# Patient Record
Sex: MALE | Race: WHITE
[De-identification: names, ages, dates, MRNs, and addresses within clinical notes are randomized per-mention and may not be internally consistent; named-entity substitution may affect disease eponyms.]

---

## 2018-10-11 ENCOUNTER — HOSPITAL ENCOUNTER (EMERGENCY)
Dept: HOSPITAL 44 - ED | Age: 68
Discharge: HOME | End: 2018-10-11
Payer: COMMERCIAL

## 2018-10-11 VITALS — DIASTOLIC BLOOD PRESSURE: 90 MMHG | SYSTOLIC BLOOD PRESSURE: 168 MMHG
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
## 2018-10-11 DIAGNOSIS — S01.81XA: ICD-10-CM

## 2018-10-11 DIAGNOSIS — S09.90XA: Primary | ICD-10-CM
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
## 2018-10-11 LAB
EGFR (NON-AFRICAN): > 60
MCH RBC QN AUTO: 28.7 PG (ref 28–34)
MCV RBC AUTO: 87 FL (ref 80–100)

## 2018-10-11 PROCEDURE — 12013 RPR F/E/E/N/L/M 2.6-5.0 CM: CPT

## 2018-10-11 PROCEDURE — 85025 COMPLETE CBC W/AUTO DIFF WBC: CPT

## 2018-10-11 PROCEDURE — 80053 COMPREHEN METABOLIC PANEL: CPT

## 2018-10-11 PROCEDURE — 70486 CT MAXILLOFACIAL W/O DYE: CPT

## 2018-10-11 PROCEDURE — 70450 CT HEAD/BRAIN W/O DYE: CPT

## 2018-10-11 PROCEDURE — 85610 PROTHROMBIN TIME: CPT

## 2018-10-11 NOTE — DIAGNOSTIC IMAGING REPORT
YEIMI GUAMAN 

[facility]

[address] [facility] [address] [facility] [address]

[place], [place]. [address]

 

 

 

 

Report Submission Date: Oct 11, 2018 7:26:36 PM CDT

Patient       Study

Name:   JOSE SANCHEZ       Date:   Oct 11, 2018 6:52:14 PM CDT

MRN:   [id number]       Modality Type:   CT

Gender:   M       Description:   CT MAXILLOFACIAL W/O D

:   48       Institution:   [facility]

Physician:   YEIMI GUAMAN

     Accession:    [id number]

 

 

CT maxillofacial without contrast 



History:  Right orbit laceration, fall 



Findings:  Transverse mandible and facial bones sections are obtained without 
contrast and are limited by motion through the orbital roofs.  A right frontal 
scalp laceration is present.  The superior orbital rim and orbital roof appear 
intact on the head CT but are distorted on this study.  The remaining facial 
bones and mandible are intact.  Small maxillary sinus mucous retention cysts are
present.  A right malar hematoma is observed. 



Impression: 



1. Nondiagnostic evaluation of the superior orbits due to motion but this region
appears intact on the head C T. 



2. Right malar hematoma and right frontal scalp laceration. 



3. Maxillary sinus retention cysts.

 

Electronically signed on Oct 11, 2018 7:26:36 PM CDT by:

Marques ROY

## 2018-10-11 NOTE — ED PHYSICIAN DOCUMENTATION
General Adult





- HISTORIAN


Historian: patient





- HPI


Stated Complaint: Facial Lac


Chief Complaint: Fall


Onset: minutes (30)


Timing: still present


Severity: moderate


Further Comments: yes (per staff with pt. He fell in the doorway at the living 

center he hit his head on the door way at the corner of his head/eye . No LOC. 

No change in mental status. No other complaints)


Last known Well Code/Unknown Code: Unknown





- ROS


CONST: no problems


GI/: none


MS/SKIN/LYMPH: none


NEURO/PSYCH: denies: headache





- PAST HX


Past History: other (hyperlpidemia, CAD, GERD )


Immunizations: UTD


Allergies/Adverse Reactions: 


                                    Allergies











Allergy/AdvReac Type Severity Reaction Status Date / Time


 


iodine Allergy   Verified 10/11/18 17:43














Home Medications: 


                                Ambulatory Orders











 Medication  Instructions  Recorded


 


Omeprazole Magnesium [Prilosec] 2 tab PO DAILY 10/11/18


 


RX: Aspirin [Dave] 1 tab PO DAILY 10/11/18


 


RX: Lovastatin [Altoprev] 1 tab PO HS 10/11/18


 


RX: Memantine HCl [Namenda] 1 tab PO BID 10/11/18


 


RX: Quetiapine Fumarate [Seroquel] 1 tab PO TID 10/11/18














- SOCIAL HX


Smoking History: non-smoker


Alcohol Use: none


Drug Use: none





- FAMILY HX


Family History: No





- VITAL SIGNS


Vital Signs: 





                                   Vital Signs











Temp Pulse Resp BP Pulse Ox


 


 98.2 F   74   17   160/78   94 


 


 10/11/18 17:22  10/11/18 17:22  10/11/18 17:22  10/11/18 17:22  10/11/18 17:22














- REVIEWED ASSESSMENTS


Nursing Assessment  Reviewed: Yes


Vitals Reviewed: Yes





Procedures


Wound Location: face


Wound's Depth, Shape: superficial


Wound Explored: clean


Anesthesia: 2% Lidocaine


Wound Repaired With: sutures


Suture Size/Type: 5:0


Number of Sutures: 4


Layer Closure?: No (laceration on mid forehead 3 cm superficial (dermabond due 

to dementia) )





Progress





- Progress


Progress: 





1930: during sutures pt was combative - with assist sutures in horizontal lac 

repair - dermabond on lateral due to mental status DG 


2000: discuss on results and wound care. Niece at bedside is agreeable DG 





ED Results Lab/Radiology





- Radiology


Radiology Impressions: 





CT brain without IV contrast 


Clinical history: History of fall 





Mastoid air cells and visible sinuses are clear. No visible skull fractures. No 

visible intracranial bleed, acute infarct, midline shift or hydrocephalus. 


No visible mass lesions. 


1.5 cm of subcutaneous soft tissue mass anterior to the anterior wall of the 

right maxillary sinus, please correlate clinically . 





Impression: 


Normal CT brain without IV contrast 


1.5 cm subcutaneous soft tissue mass in the right facial region 


Electronically signed on Oct 11, 2018 6:32:55 PM CDT by: 


Herberth Linares





- Orders


Orders: 





                                    ED Orders











 Category Date Time Status


 


 CT BRAIN W/O CONTRAST Stat Exams  10/11/18 Ordered


 


 CT MAXILLOFACIAL W/O DYE Stat Exams  10/11/18 Ordered


 


 CBC/PLATELET/DIFF Stat Lab  10/11/18 17:24 Ordered


 


 CMP Stat Lab  10/11/18 17:24 Ordered


 


 PT-INR Stat Lab  10/11/18 17:25 Ordered














General Adult Physical Exam





- PHYSICAL EXAM


GENERAL APPEARANCE: no distress


EENT: eye inspection normal, ENT inspection normal, no signs of dehydration


NECK: normal inspection


RESPIRATORY: no resp distress, chest non-tender, breath sounds normal


CVS: reg rate & rhythm, heart sounds normal, equal pulses


ABDOMEN: soft


BACK: normal inspection


SKIN: warm/dry, other (laceration on right side of forehead and down forehad mid

 approx 3 cm lateral laceration and 3 cm on right side 2 cm lateral  )


EXTREMITIES: non-tender, normal range of motion, no evidence of injury


NEURO: motor nml, sensation nml, mood/affect nml (per staff he is normal on LOC 

)





Discharge


Clincal Impression: 


Fall


Qualifiers:


 Encounter type: initial encounter Qualified Code(s): W19.XXXA - Unspecified 

fall, initial encounter





Additional Instructions: 


1. Keep area clean and dry


2. Ice to forehead


3. Follow up with PCP in 2-4 days


4. Return to ER for any concerns 


5. Tylenol or Ibuprofen as needed for pain 


Condition: Stable


Disposition: 01 HOME, SELF-CARE


Decision to Admit: NO


Date of Decison to Admit: 10/11/18


Decision Time: 20:02

## 2018-10-11 NOTE — DIAGNOSTIC IMAGING REPORT
YEIMI GUAMAN 

[facility]

[address] [facility] [address]

[place], [place]. [address]

 

 

 

 

Report Submission Date: Oct 11, 2018 6:32:55 PM CDT

Patient       Study

Name:   JOSE SANCHEZ       Date:   Oct 11, 2018 5:34:42 PM CDT

MRN:   [id number]       Modality Type:   CT\SR

Gender:   M       Description:   CT BRAIN W/O CONTRAST

:   48       Institution:   [facility]

Physician:   YEIMI GUAMAN

     Accession:    [id number]

 

 

CT brain without IV contrast 

Clinical history: History of fall 



Mastoid air cells and visible sinuses are clear. No visible skull fractures. No 
visible intracranial bleed, acute infarct, midline shift or hydrocephalus. 

No visible mass lesions. 

1.5 cm of subcutaneous soft tissue mass anterior to the anterior wall of the 
right maxillary sinus, please correlate clinically . 



Impression: 

Normal CT brain without IV contrast 

1.5 cm subcutaneous soft tissue mass in the right facial region

 

Electronically signed on Oct 11, 2018 6:32:55 PM CDT by:

Herberth ROY

## 2018-12-30 ENCOUNTER — HOSPITAL ENCOUNTER (EMERGENCY)
Dept: HOSPITAL 44 - ED | Age: 68
Discharge: NURSING, REHAB OR LONG-TERM CARE | End: 2018-12-30
Payer: COMMERCIAL

## 2018-12-30 VITALS — DIASTOLIC BLOOD PRESSURE: 64 MMHG | SYSTOLIC BLOOD PRESSURE: 122 MMHG
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
## 2018-12-30 DIAGNOSIS — S20.312A: Primary | ICD-10-CM
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
## 2018-12-30 PROCEDURE — 99284 EMERGENCY DEPT VISIT MOD MDM: CPT

## 2018-12-30 PROCEDURE — 99282 EMERGENCY DEPT VISIT SF MDM: CPT

## 2018-12-30 PROCEDURE — 72170 X-RAY EXAM OF PELVIS: CPT

## 2018-12-30 PROCEDURE — 71101 X-RAY EXAM UNILAT RIBS/CHEST: CPT

## 2018-12-30 NOTE — DIAGNOSTIC IMAGING REPORT
VALERI JOHNSTON [facility]

                           [address]

                           [place], [place]. [address]





<p>Your browser does not support iframes.</p> 

VALERI JOHNSTON  ANABELL

                         [facility]

                           [address]

                           [place], [place]. [address]









Report Submission Date: Dec 30, 2018 1:21:21 PM CST







Patient  Study  

 

Name: JOSE SANCHEZ  Date: Dec 30, 2018 12:37:10 PM CST

 

MRN: [id number]  Modality Type: DX

 

Gender: M  Description: CHEST

 

: 48  Institution: [facility]

 

Physician: PRESTON VALERI [facility]  Accession: [id number]







HISTORY: 68-year-old male fell, left chest wall pain. 



COMPARISON: None available. 



TECHNIQUE: Frontal view of the chest and multiple views of the left ribs were 
performed. 



FINDINGS: No pneumothorax, pulmonary edema, or consolidative infiltrates. The 
aortic arch is calcific. The heart is not enlarged. No evidence of rib or 
clavicular fracture. The bones are diffusely osteopenic. The right humeral head
is high-riding, consistent with significant rotator cuff tendinopathy. 



IMPRESSION: 



1. No evidence of left rib fracture. 

2. No acute intrathoracic process identified.



Electronically signed on Dec 30, 2018 1:21:21 PM CST by:

John ROY

## 2018-12-30 NOTE — DIAGNOSTIC IMAGING REPORT
Report Submission Date: Dec 30, 2018 1:18:39 PM CST







Patient  Study  

 

Name: JOSE SANCHEZ  Date: Dec 30, 2018 12:33:56 PM CST

 

MRN: [id number]  Modality Type: DX

 

Gender: M  Description: PELVIS

 

: 48  Institution: [facility]

 

Physician: VALERI JOHNSTON  Accession: [id number]







HISTORY: 68-year-old male fell, tail bone pain. 



COMPARISON: None available. 



TECHNIQUE: AP view of the pelvis was performed. 



IMPRESSION: 



1. No acute fracture of the hips or pelvis. The coccyx is not well visualized 
here. 

2. Postoperative changes of posterior spinal fusion L4-L5. 

3. Atherosclerotic calcifications of the bilateral iliac and proximal femoral 
arteries.



Electronically signed on Dec 30, 2018 1:18:39 PM CST by:

John ROY

## 2018-12-30 NOTE — ED PHYSICIAN DOCUMENTATION
General Adult





- HISTORIAN


Historian: paramedics





- [facility]


Stated Complaint: Fall


Chief Complaint: General Adult


Onset: hours


Timing: still present


Severity: moderate


Further Comments: yes (Pt is a 67 yo nursing home pt found on the floor in a 

fetal position.  It is unclear if pt fell or got onto the floor himself as he 

has been known to do.  Pt appeared to have discomfort on his L side at the hip 

and ribs and has a small abrasion on his L chest wall.   Pt has a seizure hx and

and was [facility] Dec [facility] after a seizure that lasted 10 minutes.  

Pt had a 2 minute seizure Dec 28.  He has recently had his depakote dose 

increased.  Pt has abrasions and has had skin tears from these previous seizure 

events.)





- ROS


CONST: other (Pt is a poor historian.)





- PAST HX


Past History: other (CAD, dementia with aggressive episodes; GERD, HLD, seizure 

d/o.)


Allergies/Adverse Reactions: 


                                    Allergies











Allergy/AdvReac Type Severity Reaction Status Date / Time


 


green pepper Allergy   Verified 12/30/18 13:01


 


iodine Allergy   Verified 10/11/18 17:43


 


lorazepam [From Ativan] Allergy   Verified 12/30/18 13:01


 


shellfish derived Allergy   Verified 12/30/18 13:01














Home Medications: 


                                Ambulatory Orders











 Medication  Instructions  Recorded


 


Aspirin [Dave] 1 tab PO DAILY 10/11/18


 


Lovastatin [Altoprev] 1 tab PO HS 10/11/18


 


Memantine HCl [Namenda] 1 tab PO BID 10/11/18


 


Omeprazole Magnesium [Prilosec] 2 tab PO DAILY 10/11/18


 


Quetiapine Fumarate [Seroquel] 1 tab PO TID 10/11/18














- SOCIAL HX


Smoking History: non-smoker





- FAMILY HX


Family History: No





- VITAL SIGNS


Vital Signs: 





                                   Vital Signs











Temp Pulse Resp BP Pulse Ox


 


 98.2 F   84   16   133/60   100 


 


 12/30/18 12:33  12/30/18 12:33  12/30/18 12:33  12/30/18 12:33  12/30/18 12:33














- REVIEWED ASSESSMENTS


Nursing Assessment  Reviewed: Yes


Vitals Reviewed: Yes





Progress





- Progress


Progress: 





X-ray pelvis: 


1.  No acute fracture of the hips or pelvis.  The coccyx is not well visualized 

here. 


2.  Postoperative changes of posterior spinal fusion L4-L5. 


3.  Atherosclerotic calcifications of the bilateral iliac and proximal femoral 

arteries.





X-ray chest, L ribs: 


1.  No evidence of left rib fracture. 


2.  No acute intrathoracic process identified.


 





ED Results Lab/Radiology





- Orders


Orders: 





                                    ED Orders











 Category Date Time Status


 


 PELVIS AP 1 OR 2 VIEWS [RAD] Stat Exams  12/30/18 Taken


 


 RIBS UNILATERAL W/ PA CHEST [RAD] Stat Exams  12/30/18 Taken














General Adult Physical Exam





- PHYSICAL EXAM


GENERAL APPEARANCE: mild distress


EENT: pharynx normal


NECK: normal inspection, supple


RESPIRATORY: no resp distress, chest non-tender, breath sounds normal


CVS: reg rate & rhythm, heart sounds normal


ABDOMEN: soft, no organomegaly, normal bowel sounds


BACK: normal inspection


SKIN: other (small abrasion L chest wall)


EXTREMITIES: normal range of motion


NEURO: CN's nml as tested, motor nml, sensation nml, other (chronic unsteady 

gait; baseline ms per wife)





Discharge


Clincal Impression: 


 possible fall, small chest wall abrasion





Referrals: 


Primary Doctor,No [Primary Care Provider] - 


Condition: Stable


Disposition: 04 XFER NURSING HOME


Decision to Admit: NO


Decision Time: 13:54

## 2019-04-14 ENCOUNTER — HOSPITAL ENCOUNTER (EMERGENCY)
Dept: HOSPITAL 44 - ED | Age: 69
Discharge: NURSING, REHAB OR LONG-TERM CARE | End: 2019-04-14
Payer: MEDICARE

## 2019-04-14 VITALS — DIASTOLIC BLOOD PRESSURE: 67 MMHG | SYSTOLIC BLOOD PRESSURE: 112 MMHG
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
## 2019-04-14 DIAGNOSIS — E86.0: Primary | ICD-10-CM

## 2019-04-14 LAB
BASOPHILS NFR BLD: 0.7 % (ref 0–1.5)
EGFR (NON-AFRICAN): > 60
EOSINOPHIL NFR BLD: 1.6 % (ref 0–6.8)
MCH RBC QN AUTO: 28.2 PG (ref 28–34)
MCV RBC AUTO: 84 FL (ref 80–100)
MONOCYTES %: 7 % (ref 0–11)
NEUTROPHILS #: 6.8 # K/UL (ref 1.4–7.7)

## 2019-04-14 PROCEDURE — 85025 COMPLETE CBC W/AUTO DIFF WBC: CPT

## 2019-04-14 PROCEDURE — 96360 HYDRATION IV INFUSION INIT: CPT

## 2019-04-14 PROCEDURE — 99284 EMERGENCY DEPT VISIT MOD MDM: CPT

## 2019-04-14 PROCEDURE — 36415 COLL VENOUS BLD VENIPUNCTURE: CPT

## 2019-04-14 PROCEDURE — 99283 EMERGENCY DEPT VISIT LOW MDM: CPT

## 2019-04-14 PROCEDURE — S1016 NON-PVC INTRAVENOUS ADMINIST: HCPCS

## 2019-04-14 PROCEDURE — 80053 COMPREHEN METABOLIC PANEL: CPT

## 2019-04-14 NOTE — ED PHYSICIAN DOCUMENTATION
Fall





- HISTORIAN


Historian: paramedics, other (Nursing home report)





- HPI


Stated Complaint: Fall


Chief Complaint: Fall


Additional Information: 


Patient is  a 68-year-old male that presents to the ER via [facility] from [facility].  Per report- patient was found lying on the floor- he was sent to 

ER for evaluation.  Patient has dementia and has been non-verbal.  He does not 

appear to have any injuries- no shortening or rotation noted to legs- he is able

to bend knees without discomfort- no abrasions, bruises, or skin issues noted. 


Onset: today


Where: home ([facility])


Context: fell from standing


r: mild


Associated Symptoms:: no loss of consciousness


Location of Pain/Injury: denies: head, lower back, R shoulder, L shoulder, upper

extremity, lower extremity, hip


Injury to Right Extremity: none


Injury to Left Extremity: none


Further Comments: no





- ROS


CONST: weakness


NEURO: denies: dizziness


MS/SKIN/LYMPH: weakness


EYES/ENT: none


CVS/RESP: none


GI/: denies: vomiting





- PAST HX


Past History: other (GERD, CAD, HLD, Gastritis, Hiatal Hernia, Depression, 

Insomnia, Seizures)


Immunizations: UTD


Allergies/Adverse Reactions: 


                                    Allergies











Allergy/AdvReac Type Severity Reaction Status Date / Time


 


green pepper Allergy   Verified 04/14/19 14:39


 


iodine Allergy   Verified 04/14/19 14:39


 


lorazepam [From Ativan] Allergy   Verified 04/14/19 14:39


 


shellfish derived Allergy   Verified 04/14/19 14:39














Home Medications: 


                                Ambulatory Orders











 Medication  Instructions  Recorded


 


Buspirone HCl [BUSPAR] 5 mg PO TID 04/14/19


 


FLUoxetine HCL [Prozac] 10 mg PO AM 04/14/19


 


Hydroxyzine Pamoate [Vistaril] 25 mg PO TID PRN 04/14/19


 


Lovastatin [Altoprev] 20 mg PO HS 04/14/19


 


Melatonin 5 mg PO HS 04/14/19


 


Mirtazapine [Remeron] 15 mg PO HS 04/14/19


 


Trazodone HCl 50 mg PO HS 04/14/19


 


Valproic Acid (As Sodium Salt) 625 mg PO TID 04/14/19





[Valproic Acid]  














- SOCIAL HX


Smoking History: non-smoker


Alcohol Use: none


Drug Use: none





- FAMILY HX


Family History: none





- VITAL SIGNS


Vital Signs: 





                                   Vital Signs











Temp Pulse Resp BP Pulse Ox


 


 97.9 F   79   14   111/65   96 


 


 04/14/19 14:39  04/14/19 14:39  04/14/19 14:39  04/14/19 14:39  04/14/19 14:39














- REVIEWED ASSESSMENTS


Nursing Assessment  Reviewed: Yes


Vitals Reviewed: Yes





Progress





- Progress


Progress: 


15:15 Attempted to contact [place] to update with report- patient dehydrated- 

IVF given.





15:45 Blood pressure improved and more alert after 1 liter of NS- will send 

patient home to [place]- spoke with wife at the bedside- she is in agreement 

with plan.





ED Results Lab/Radiology





- Orders


Orders: 





                                    ED Orders











 Category Date Time Status


 


 Place IV Lock 1T Care  04/14/19 14:51 Ordered


 


 CBC/PLATELET/DIFF Routine Lab  04/14/19 14:43 Ordered


 


 CMP Routine Lab  04/14/19 14:43 Ordered


 


 NORMAL SALINE @ 500 MLS/HR ( 1000ml BOLUS) Med  04/14/19 14:51 Ordered





 0.9 % Sodium Chloride [Normal Saline] 1,000 ml   





 IV Q2H   














Fall Physical Exam





- Physical Exam


General Appearance: no acute distress, alert


Head: non-tender, no swelling, no obvious injury


Neck: non-tender, trachea midline


Eye: JESSICA, lids & conjunct. nml


ENT: nml external inspection, airway nml


Resp/CVS: chest non-tender, breath sounds nml, heart sounds nml


Abdomen: soft, normal bowel sounds


Neuro:  symmetrical, other (dementia- at baseline)


Skin: pallor, warm, dry


Back: normal inspection


Extremities: atraumatic, pelvis stable, hips non-tender, no pedal edema, nml ROM


Joint: joints nml, nml ROM





- Zuleika Coma Score


Eyes Open: To Voice


Speech: Inappropriate (bas)


Motor: Localizes to Pain (Patient neuro at baseline)





Discharge


Clincal Impression: 


 Fall, Dehydration





Referrals: 


Primary Doctor,No [Primary Care Provider] - 2 Days


Additional Instructions: 


Encourage oral liquids





Condition: Stable


Disposition: 04 XFER NURSING HOME


Decision to Admit: NO


Decision Time: 15:45